# Patient Record
(demographics unavailable — no encounter records)

---

## 2024-11-12 NOTE — ASSESSMENT
[FreeTextEntry1] : Improved bilateral triceps surae contractures  I advised reevaluation on a as needed basis.

## 2024-11-12 NOTE — CONSULT LETTER
[Dear  ___] : Dear  [unfilled], [Consult Letter:] : I had the pleasure of evaluating your patient, [unfilled]. [Please see my note below.] : Please see my note below. [Consult Closing:] : Thank you very much for allowing me to participate in the care of this patient.  If you have any questions, please do not hesitate to contact me. [Sincerely,] : Sincerely, [FreeTextEntry3] : Dr Hopper

## 2024-11-12 NOTE — HISTORY OF PRESENT ILLNESS
[FreeTextEntry1] : This 6-year-old male returns for reevaluation of bilateral triceps surae contractures.  The mother states he is no longer using bracing.  Family does do some stretching periodically.  Child does not complain of any pain.

## 2024-11-12 NOTE — PHYSICAL EXAM
[FreeTextEntry1] : On physical examination his gait is normal.  He has a full range of motion of the hips knees ankles and subtalar joints.